# Patient Record
Sex: FEMALE | Race: BLACK OR AFRICAN AMERICAN | NOT HISPANIC OR LATINO | URBAN - METROPOLITAN AREA
[De-identification: names, ages, dates, MRNs, and addresses within clinical notes are randomized per-mention and may not be internally consistent; named-entity substitution may affect disease eponyms.]

---

## 2023-01-28 ENCOUNTER — EMERGENCY (EMERGENCY)
Facility: HOSPITAL | Age: 38
LOS: 1 days | Discharge: HOME | End: 2023-01-28
Attending: STUDENT IN AN ORGANIZED HEALTH CARE EDUCATION/TRAINING PROGRAM | Admitting: STUDENT IN AN ORGANIZED HEALTH CARE EDUCATION/TRAINING PROGRAM
Payer: COMMERCIAL

## 2023-01-28 VITALS
DIASTOLIC BLOOD PRESSURE: 84 MMHG | OXYGEN SATURATION: 100 % | HEART RATE: 87 BPM | SYSTOLIC BLOOD PRESSURE: 140 MMHG | RESPIRATION RATE: 16 BRPM | TEMPERATURE: 98 F

## 2023-01-28 VITALS
OXYGEN SATURATION: 100 % | HEART RATE: 107 BPM | RESPIRATION RATE: 16 BRPM | TEMPERATURE: 98 F | DIASTOLIC BLOOD PRESSURE: 77 MMHG | SYSTOLIC BLOOD PRESSURE: 108 MMHG

## 2023-01-28 LAB
ALBUMIN SERPL ELPH-MCNC: 4.1 G/DL — SIGNIFICANT CHANGE UP (ref 3.3–5)
ALP SERPL-CCNC: 61 U/L — SIGNIFICANT CHANGE UP (ref 40–120)
ALT FLD-CCNC: 33 U/L — SIGNIFICANT CHANGE UP (ref 4–33)
ANION GAP SERPL CALC-SCNC: 10 MMOL/L — SIGNIFICANT CHANGE UP (ref 7–14)
APPEARANCE UR: ABNORMAL
AST SERPL-CCNC: 28 U/L — SIGNIFICANT CHANGE UP (ref 4–32)
BACTERIA # UR AUTO: NEGATIVE — SIGNIFICANT CHANGE UP
BASOPHILS # BLD AUTO: 0.04 K/UL — SIGNIFICANT CHANGE UP (ref 0–0.2)
BASOPHILS NFR BLD AUTO: 0.3 % — SIGNIFICANT CHANGE UP (ref 0–2)
BILIRUB SERPL-MCNC: <0.2 MG/DL — SIGNIFICANT CHANGE UP (ref 0.2–1.2)
BILIRUB UR-MCNC: NEGATIVE — SIGNIFICANT CHANGE UP
BLD GP AB SCN SERPL QL: NEGATIVE — SIGNIFICANT CHANGE UP
BUN SERPL-MCNC: 11 MG/DL — SIGNIFICANT CHANGE UP (ref 7–23)
CALCIUM SERPL-MCNC: 9.6 MG/DL — SIGNIFICANT CHANGE UP (ref 8.4–10.5)
CHLORIDE SERPL-SCNC: 102 MMOL/L — SIGNIFICANT CHANGE UP (ref 98–107)
CO2 SERPL-SCNC: 24 MMOL/L — SIGNIFICANT CHANGE UP (ref 22–31)
COLOR SPEC: ABNORMAL
CREAT SERPL-MCNC: 0.66 MG/DL — SIGNIFICANT CHANGE UP (ref 0.5–1.3)
DIFF PNL FLD: ABNORMAL
EGFR: 115 ML/MIN/1.73M2 — SIGNIFICANT CHANGE UP
EOSINOPHIL # BLD AUTO: 0.05 K/UL — SIGNIFICANT CHANGE UP (ref 0–0.5)
EOSINOPHIL NFR BLD AUTO: 0.4 % — SIGNIFICANT CHANGE UP (ref 0–6)
EPI CELLS # UR: 2 /HPF — SIGNIFICANT CHANGE UP (ref 0–5)
GLUCOSE SERPL-MCNC: 81 MG/DL — SIGNIFICANT CHANGE UP (ref 70–99)
GLUCOSE UR QL: NEGATIVE — SIGNIFICANT CHANGE UP
HCG SERPL-ACNC: 2639 MIU/ML — SIGNIFICANT CHANGE UP
HCT VFR BLD CALC: 38.5 % — SIGNIFICANT CHANGE UP (ref 34.5–45)
HGB BLD-MCNC: 12.3 G/DL — SIGNIFICANT CHANGE UP (ref 11.5–15.5)
HYALINE CASTS # UR AUTO: 1 /LPF — SIGNIFICANT CHANGE UP (ref 0–7)
IANC: 9.64 K/UL — HIGH (ref 1.8–7.4)
IMM GRANULOCYTES NFR BLD AUTO: 0.5 % — SIGNIFICANT CHANGE UP (ref 0–0.9)
INR BLD: 1.04 RATIO — SIGNIFICANT CHANGE UP (ref 0.88–1.16)
KETONES UR-MCNC: NEGATIVE — SIGNIFICANT CHANGE UP
LEUKOCYTE ESTERASE UR-ACNC: NEGATIVE — SIGNIFICANT CHANGE UP
LYMPHOCYTES # BLD AUTO: 2.68 K/UL — SIGNIFICANT CHANGE UP (ref 1–3.3)
LYMPHOCYTES # BLD AUTO: 20.2 % — SIGNIFICANT CHANGE UP (ref 13–44)
MCHC RBC-ENTMCNC: 26.6 PG — LOW (ref 27–34)
MCHC RBC-ENTMCNC: 31.9 GM/DL — LOW (ref 32–36)
MCV RBC AUTO: 83.3 FL — SIGNIFICANT CHANGE UP (ref 80–100)
MONOCYTES # BLD AUTO: 0.81 K/UL — SIGNIFICANT CHANGE UP (ref 0–0.9)
MONOCYTES NFR BLD AUTO: 6.1 % — SIGNIFICANT CHANGE UP (ref 2–14)
NEUTROPHILS # BLD AUTO: 9.64 K/UL — HIGH (ref 1.8–7.4)
NEUTROPHILS NFR BLD AUTO: 72.5 % — SIGNIFICANT CHANGE UP (ref 43–77)
NITRITE UR-MCNC: NEGATIVE — SIGNIFICANT CHANGE UP
NRBC # BLD: 0 /100 WBCS — SIGNIFICANT CHANGE UP (ref 0–0)
NRBC # FLD: 0 K/UL — SIGNIFICANT CHANGE UP (ref 0–0)
PH UR: 5.5 — SIGNIFICANT CHANGE UP (ref 5–8)
PLATELET # BLD AUTO: 396 K/UL — SIGNIFICANT CHANGE UP (ref 150–400)
POTASSIUM SERPL-MCNC: 5 MMOL/L — SIGNIFICANT CHANGE UP (ref 3.5–5.3)
POTASSIUM SERPL-SCNC: 5 MMOL/L — SIGNIFICANT CHANGE UP (ref 3.5–5.3)
PROT SERPL-MCNC: 8.6 G/DL — HIGH (ref 6–8.3)
PROT UR-MCNC: ABNORMAL
PROTHROM AB SERPL-ACNC: 12.1 SEC — SIGNIFICANT CHANGE UP (ref 10.5–13.4)
RBC # BLD: 4.62 M/UL — SIGNIFICANT CHANGE UP (ref 3.8–5.2)
RBC # FLD: 14.4 % — SIGNIFICANT CHANGE UP (ref 10.3–14.5)
RBC CASTS # UR COMP ASSIST: SIGNIFICANT CHANGE UP /HPF (ref 0–4)
RH IG SCN BLD-IMP: POSITIVE — SIGNIFICANT CHANGE UP
SODIUM SERPL-SCNC: 136 MMOL/L — SIGNIFICANT CHANGE UP (ref 135–145)
SP GR SPEC: 1.03 — SIGNIFICANT CHANGE UP (ref 1.01–1.05)
UROBILINOGEN FLD QL: SIGNIFICANT CHANGE UP
WBC # BLD: 13.28 K/UL — HIGH (ref 3.8–10.5)
WBC # FLD AUTO: 13.28 K/UL — HIGH (ref 3.8–10.5)
WBC UR QL: 3 /HPF — SIGNIFICANT CHANGE UP (ref 0–5)

## 2023-01-28 PROCEDURE — 76817 TRANSVAGINAL US OBSTETRIC: CPT | Mod: 26

## 2023-01-28 PROCEDURE — 99285 EMERGENCY DEPT VISIT HI MDM: CPT

## 2023-01-28 PROCEDURE — 76801 OB US < 14 WKS SINGLE FETUS: CPT | Mod: 26

## 2023-01-28 RX ORDER — ACETAMINOPHEN 500 MG
975 TABLET ORAL ONCE
Refills: 0 | Status: COMPLETED | OUTPATIENT
Start: 2023-01-28 | End: 2023-01-28

## 2023-01-28 NOTE — ED PROVIDER NOTE - PHYSICAL EXAMINATION
Const: not in acute distress  Eyes: no conjunctival injection  HEENT: Head NCAT, Moist MM.  Neck: Trachea midline.   CVS: +S1/S2, Peripheral pulses 2+ and equal in all extremities.  RESP: Unlabored respiratory effort. Clear to auscultation bilaterally.  GI: Nontender/Nondistended, No CVA tenderness b/l.   : blood pooling vaginal vault.  MSK: Normocephalic/Atraumatic, No Lower Extremities edema b/l.   Skin: Intact.   Neuro: Motor & Sensation grossly intact.  Psych: Awake, Alert, & Oriented (AAO) x3.

## 2023-01-28 NOTE — ED PROVIDER NOTE - CLINICAL SUMMARY MEDICAL DECISION MAKING FREE TEXT BOX
36 yo  female, 11 weeks pregnant with no significant PMhx, presenting with vaginal bleeding. Vitals stable. Blood pooling in vaginal vault. Concern for spontaneous . Will get labs, type and screen, and TVUS.

## 2023-01-28 NOTE — ED PROVIDER NOTE - OBJECTIVE STATEMENT
38 yo  female, 11 weeks pregnant with no significant PMhx, presenting with vaginal bleeding. Bleeding started on Wednesday, light. Patient report cramping started last night. Patient took some tylenol, which resolved the cramping. Today, patient stated ~2-3 hrs ago, patient passed large clot with worsened vaginal bleeding since then. Patient also has associated cramping. Patient denies fever, CP, SOB, n/v, and dysuria. No history of miscarriage. Last pregnancy 15 yrs ago, uncomplicated.

## 2023-01-28 NOTE — ED PROVIDER NOTE - NS ED ROS FT
CONST: no fevers, no chills, no trauma  EYES: no blurry vision   ENT: no sore throat  CV: no chest pain, no extremity swelling  RESP: no shortness of breath  ABD: (+) abdominal pain, no nausea, no vomiting, no diarrhea, no melena  : no dysuria, no hematuria, no frequency, (+) vaginal bleeding  MSK: no back pain, no neck pain, no extremity pain  NEURO: no headache, no focal weakness, no dizziness  HEME: no bruising  SKIN: no rash

## 2023-01-28 NOTE — ED PROVIDER NOTE - ATTENDING CONTRIBUTION TO CARE
Dr. Mo, Attending Physician-  I performed a face to face bedside interview with patient regarding history of present illness, review of symptoms and past medical history. I completed an independent physical exam.  I have discussed patient's plan of care with the resident.    37F, ~11 weeks preg, no other sig PMH, who presents with vag bleeding. First started with light bleeding approx 2 days prior and has persisted. Then bleeding began to increase with passage of clot like material today and thus presents at this time. Has not yet established care for this pregnancy. +abdominal cramping. No hx of miscarriage or ectopic. No headaches, fevers, chills, cough, sputum, cp, sob, nvd. Physical: afebrile, well appearing, rrr, ctabl, abdomen soft, pelvic (with female resident present): mild pooling of dark blood in vaginal vault. Plan: labs, TVUS, T/S. Will evaluate for threatened AB.

## 2023-01-28 NOTE — ED PROVIDER NOTE - PATIENT PORTAL LINK FT
You can access the FollowMyHealth Patient Portal offered by [facility] by registering at the following website: [email or web address]. By joining [facility]’s FollowMyHealth portal, you will also be able to view your health information using other applications (apps) compatible with our system.

## 2023-01-28 NOTE — ED ADULT NURSE NOTE - OBJECTIVE STATEMENT
Pt received in intake 5. Pt is a&ox4, breathing spontaneously and nonlabored, no acute distress in appearance. Pt is a an 11 weeks pregnant female presenting with abdominal cramping and bleeding since Tuesday, but the bleeding has increased and has been clots today, soaking through 3 pads in an hour, cramping is intermittent at this time. 20g IV placed in 2 ac, labs drawn and sent as per orders.

## 2023-01-28 NOTE — CONSULT NOTE ADULT - ASSESSMENT
- No acute GYN intervention   - Patient to follow up with private OBGYN in [place] on Monday    Leidy Faye, PGY2  d/w Dr. Medina  36 yo  LMP 22 (at 11+3 by LMP and first trimester ultrasound) presenting with vaginal bleeding for 4 days. Previously confirmed IUP with GYN in [place]. H+H wnl. WBC 13. VSS. TVUS showing gestational sac in the uterus with no yolk sac or fetal pole. Consistent with spontaneous  in progress.     - No acute GYN intervention   - Patient to follow up with private OBGYN in [place] on Monday  - Return precautions reviewed: heavy vaginal bleeding saturating more than 1 pad in an hour, severe abdominal pain, fainting/dizziness     Leidy Faye, PGY2  d/w Dr. Medina  38 yo  LMP 22 (at 11+3 by LMP and first trimester ultrasound) presenting with vaginal bleeding for 4 days. Previously confirmed IUP with GYN in [place]. H+H wnl. WBC 13. VSS. TVUS showing gestational sac in the uterus with no yolk sac or fetal pole. Consistent with spontaneous  in progress.     - No acute GYN intervention   - Patient to follow up with private OBGYN in [place] on Monday  - Return precautions reviewed: heavy vaginal bleeding saturating more than 1 pad in an hour, severe abdominal pain, fainting/dizziness     Leidy Faye, PGY2  d/w Dr. Medina     38 yo  LMP 22c/o vaginal bleeding for 4 days. TVUS showing myomatous uterus with gestational sac in the cervix with no fetal pole. Consistent with inevitable Ab of non-viable pregnancy.  Patient to f/u as an outpatient.  Joshua Medina M.D.

## 2023-01-28 NOTE — CONSULT NOTE ADULT - SUBJECTIVE AND OBJECTIVE BOX
JUAN WEEKS  37y  Female [id number]    HPI:  38 yo  LMP 22 (at 11+3 by LMP and first trimester ultrasound) presenting with vaginal bleeding for 4 days. Patient states that she has had vaginal spotting since Wednesday, not saturating any pads. Patient was using panty liners as needed. States the bleeding became heavier today when she passed a few grape sized clots this evening prompting her to present to the ED. Patient states she passed additional grape sized clots in the ED prior to her ultrasound. She states the bleeding has since lessened. Denies passing fetal tissue that she noticed.  States she follows with a gynecologist in [place] where she is from. Patient was in [place] for a party this evening. She had a confirmed intrauterine pregnancy with her OB. Denies abdominal pain, cramping, dizziness, shortness of breath, palpitations.     Name of GYN Physician: GYN in [facility]     Past OB:   x1 in     Past gyn: History of fibroids. Denies cysts, endometriosis, STI's, Abnormal pap smears     Home meds: PNV    Allergies: No Known Allergies    PAST MEDICAL & SURGICAL HISTORY:  - No pertinent past medical history  - No significant past surgical history    Social History:  Denies smoking use, drug use, alcohol use.       Vital Signs Last 24 Hrs  T(C): 36.7 (2023 22:35), Max: 36.7 (2023 18:47)  T(F): 98.1 (2023 22:35), Max: 98.1 (2023 22:35)  HR: 87 (2023 22:35) (87 - 107)  BP: 140/84 (2023 22:35) (108/77 - 140/84)  BP(mean): --  RR: 16 (2023 22:35) (16 - 16)  SpO2: 100% (2023 22:35) (100% - 100%)    Parameters below as of 2023 22:35  Patient On (Oxygen Delivery Method): room air        Physical Exam:   General: sitting comfortably in bed, NAD   Abd: Soft, non-tender, non-distended.   :  Scant bleeding on pad changed 3+ hours ago.    External labia wnl.  Bimanual exam with no cervical motion tenderness, uterus wnl, adnexa non palpable b/l.  Cervix closed.   Speculum Exam: No active bleeding from os.  5-10 cc clot in posterior fornix.   Ext: non-tender b/l, no edema     LABS:                              12.3   13.28 )-----------( 396      ( 2023 19:30 )             38.5         136  |  102  |  11  ----------------------------<  81  5.0   |  24  |  0.66    Ca    9.6      2023 19:30    TPro  8.6<H>  /  Alb  4.1  /  TBili  <0.2  /  DBili  x   /  AST    /  ALT  33  /  AlkPhos  61      I&O's Detail    PT/INR - ( 2023 19:30 )   PT: 12.1 sec;   INR: 1.04 ratio           Urinalysis Basic - ( 2023 19:30 )    Color: Light Orange / Appearance: Slightly Turbid / S.027 / pH: x  Gluc: x / Ketone: Negative  / Bili: Negative / Urobili: <2 mg/dL   Blood: x / Protein: 30 mg/dL / Nitrite: Negative   Leuk Esterase: Negative / RBC: Many /HPF / WBC 3 /HPF   Sq Epi: x / Non Sq Epi: 2 /HPF / Bacteria: Negative    HCG Quantitative, Serum: 2639.0    RADIOLOGY & ADDITIONAL STUDIES:    < from: US OB <=14 Weeks, First Gestation (23 @ 20:53) >   US OB LES THAN 14 WKS 1ST GEST   ORDERED BY: YAO LOPES     ACC: [id number] EXAM:  US OB TRANSVAGINAL   ORDERED BY: YAO LOPES     PROCEDURE DATE:  2023          INTERPRETATION:  CLINICAL INFORMATION: First trimester pregnancy.   Presents with vaginal blood clots. Beta-hCG 2639    LMP: 2022    Estimated Gestational Age by LMP: 11 weeks 3 days    COMPARISON: None available.    Endovaginal pelvic sonogram as per order. Transabdominal pelvic sonogram   was also performed aspart of our standard protocol.    FINDINGS:  Uterus: 13.3 x 9.6 cm. Leiomyomatous uterus. No intrauterine pregnancy.   Thickened endometrium measuring up to 4.5 cm with increased vascularity.   Gestational sac visualized within the cervix with no yolk sac or fetal   pole.    Right ovary: Not visualized.  Left ovary: 3.0 cm x 1.8 cm x 2.0 cm. Within normal limits.    Fluid: None.    IMPRESSION:  Enlarged fibroid uterus. Heterogeneous thickened endometrium with cystic   structure in the endocervical canal most likely representing an    in progress. Continual follow up beta hCGs to normalization is   recommended with repeat ultrasound as warranted based on the trending   beta values.    --- End of Report ---          < end of copied text >

## 2023-01-28 NOTE — ED PROVIDER NOTE - NSFOLLOWUPINSTRUCTIONS_ED_ALL_ED_FT
You were seen in the Emergency Department for vaginal bleeding during pregnancy. Your ultrasound did not visualize an intrauterine pregnancy. You will have to follow up with your OB in 2 days for repeat beta-hcg and ultrasound.    1) Advance activity as tolerated.   2) Continue all previously prescribed medications as directed.    3) Follow up with your OB in 24-48 hours - take copies of your results.    4) Return to the Emergency Department for worsening or persistent symptoms, and/or ANY NEW OR CONCERNING SYMPTOMS.

## 2023-01-29 LAB
CULTURE RESULTS: SIGNIFICANT CHANGE UP
SPECIMEN SOURCE: SIGNIFICANT CHANGE UP